# Patient Record
Sex: FEMALE | Race: WHITE | URBAN - METROPOLITAN AREA
[De-identification: names, ages, dates, MRNs, and addresses within clinical notes are randomized per-mention and may not be internally consistent; named-entity substitution may affect disease eponyms.]

---

## 2019-04-10 ENCOUNTER — HOSPITAL ENCOUNTER (OUTPATIENT)
Dept: PHYSICAL THERAPY | Age: 79
Discharge: HOME | End: 2019-04-10
Payer: MEDICARE

## 2019-04-10 PROCEDURE — 97161 PT EVAL LOW COMPLEX 20 MIN: CPT | Performed by: PHYSICAL THERAPIST

## 2019-04-10 NOTE — PROGRESS NOTES
.[facility] [address] [facility] THERAPY 
 [facility] [address],[facility], [address] - Phone: [phone]  Fax: [phone] PLAN OF CARE / STATEMENT OF MEDICAL NECESSITY FOR PHYSICAL THERAPY SERVICES Patient Name: Cristhian Parrish : 1940 Medical  
Diagnosis: lbp Treatment Diagnosis: Low back pain [M54.5] Onset Date: chronic Referral Source: [facility]): 4/10/2019 Prior Hospitalization: See medical history Provider #: [id number] Prior Level of Function: All adls limited by pain Comorbidities: FMS, depression arthritis, high blood pressure, CA Medications: Verified on Patient Summary List  
The Plan of Care and following information is based on the information from the initial evaluation.  
=========================================================================================== Assessment / key information:  [de-identified] F arrives to clinic with multiple body joint c/lo including low back, hips, knee, ankle/feet neck and hands. Per pt these sx are chronic and have progressed over the last 5 years and now requires A with all community activities. Dx with lumbar spinal stenosis. She has recently seen a neurologist from [facility] who is performing additional cervical imaging and is pending return. She had an RIAN in l5/s1 on 19 with improvement in sx. Objective findings: ls flexion 75% with finger tips to mid shin and rfis reducing sx, RR, ext and RSB 90% loss with increasing pain, myotome reduced l3/5 R>L, dermatome unremarkable, MSR <15\" B, SLS <5\", able to negotiate 8 steps with lateral reciprocal gait pattern, she is able to ambulate >/=600' without rest but increased lateral trunk lean and reduced stride length. At this time pt has deferred any evaluation for motorized scooter due to ability to ambulate into community. Will attempt aquatic therapy to address problem list below =========================================================================================== Eval Complexity: History HIGH Complexity :3+ comorbidities / personal factors will impact the outcome/ POC ;  Examination  HIGH Complexity : 4+ Standardized tests and measures addressing body structure, function, activity limitation and / or participation in recreation ; Presentation LOW Complexity : Stable, uncomplicated ;  Decision Making MEDIUM Complexity : FOTO score of 26-74; Overall Complexity LOW Problem List: pain affecting function, decrease ROM, decrease strength, decrease ADL/ functional abilitiies, decrease activity tolerance, decrease flexibility/ joint mobility and decrease transfer abilities Treatment Plan may include any combination of the following: Therapeutic exercise, Therapeutic activities, Neuromuscular re-education, Physical agent/modality, Gait/balance training, Manual therapy, Aquatic therapy, Patient education, Self Care training, Functional mobility training, Home safety training and Stair training Patient / Family readiness to learn indicated by: asking questions, trying to perform skills and interest 
Persons(s) to be included in education: patient (P) Barriers to Learning/Limitations: None Measures taken, if barriers to learning:   
Patient Goal (s): Decrease pain, increase ambulation Patient self reported health status: fair Rehabilitation Potential: fair ? Short Term Goals: To be accomplished in  2  weeks: 1. Pt will ambulate >/=5 minutes on treadmill in order to increase ambulation 2. Pt will be able to perform 10 mini-squats in order to improve sit<>stand 3. Pt will perform 10 standing hip abduction in order to improve pelvic stability during ambulation ? Long Term Goals: To be accomplished in  4  weeks: 1. Pt will increase FOTO by 10 points in order to show functional improvement 2. Pt will ambulate >/=10' on treadmill in order to improve community ambulation 3. Pt will note daily max pain </=7/10 in order to increase participation Frequency / Duration:   Patient to be seen  2  times per week for 4  weeks: 
Patient / Caregiver education and instruction: self care and activity modification Therapist Signature: Shani Chanel DPT [facility] Date: 4/10/2019 Certification Period: 4/10/19-7/5/19 Time: 3:31 PM  
=========================================================================================== I certify that the above Physical Therapy Services are being furnished while the patient is under my care. I agree with the treatment plan and certify that this therapy is necessary. Physician Signature:       Date:      Time:  Please sign and return to [facility]. or you may fax the signed copy to [phone]. Thank you.

## 2019-04-10 NOTE — PROGRESS NOTES
[facility] PHYSICAL THERAPY - DAILY TREATMENT NOTE Patient Name: Unique Xie        Date: 4/10/2019 : 1940   yes Patient  Verified Visit #:     Insurance: Payor: Sha Many / Plan: [place] & B / Product Type: Medicare / In time: 338 Out time: 415 Total Treatment Time: 34 Medicare/BCBS Time Tracking (below) Total Timed Codes (min):  0 1:1 Treatment Time:  0  
TREATMENT AREA =  Low back pain [M54.5] SUBJECTIVE Pain Level (on 0 to 10 scale):  6  / 10 Medication Changes/New allergies or changes in medical history, any new surgeries or procedures?    no  If yes, update Summary List  
Subjective Functional Status/Changes:  []  No changes reported See ie OBJECTIVE Billed With/As: 
 [] TE 
 [] TA 
 [] Neuro 
 [] Self Care Patient Education: [x] Review HEP [] Progressed/Changed HEP based on:  
[] positioning   [] body mechanics   [] transfers   [] heat/ice application   
[] other:   
Other Objective/Functional Measures: 
 
See ie Post Treatment Pain Level (on 0 to 10) scale:    10 ASSESSMENT Assessment/Changes in Function:  
 
See ie 
  
[]  See Progress Note/Recertification Patient will continue to benefit from skilled PT services to modify and progress therapeutic interventions, address functional mobility deficits, address ROM deficits, address strength deficits, analyze and address soft tissue restrictions, analyze and cue movement patterns, analyze and modify body mechanics/ergonomics, assess and modify postural abnormalities, address imbalance/dizziness and instruct in home and community integration to attain remaining goals. Progress toward goals / Updated goals: 
See ie PLAN 
[]  Upgrade activities as tolerated yes Continue plan of care  
[]  Discharge due to :   
[]  Other:   
 
Therapist: Fredi Espinoza, PT Date: 4/10/2019 Time: 3:32 PM  
 
No future appointments.

## 2019-04-15 ENCOUNTER — APPOINTMENT (OUTPATIENT)
Dept: PHYSICAL THERAPY | Age: 79
End: 2019-04-15
Payer: MEDICARE

## 2019-04-18 ENCOUNTER — HOSPITAL ENCOUNTER (OUTPATIENT)
Dept: PHYSICAL THERAPY | Age: 79
Discharge: HOME | End: 2019-04-18
Payer: MEDICARE

## 2019-04-18 PROCEDURE — 97113 AQUATIC THERAPY/EXERCISES: CPT

## 2019-04-22 ENCOUNTER — HOSPITAL ENCOUNTER (OUTPATIENT)
Dept: PHYSICAL THERAPY | Age: 79
Discharge: HOME | End: 2019-04-22
Payer: MEDICARE

## 2019-04-22 PROCEDURE — 97113 AQUATIC THERAPY/EXERCISES: CPT

## 2019-04-22 NOTE — PROGRESS NOTES
PHYSICAL THERAPY - DAILY TREATMENT NOTE - AQUATIC THERAPY Patient Name: So Valencia        Date: 2019 : 1940   YES Patient  Verified Visit #:   3   of   9  Insurance: Payor: Carlos Burch / Plan: [place] MEDICARE PART A & B / Product Type: Medicare / In time: 105 Out time: 130 Total Treatment Time: 25 Medicare Time Tracking (below) Total Timed Codes (min):  25 1:1 Treatment Time:  25 TREATMENT AREA =  Low back pain [M54.5] SUBJECTIVE Pain Level (on 0 to 10 scale):  5  / 10 Medication Changes/New allergies or changes in medical history, any new surgeries or procedures? NO If yes, update Summary List  
Subjective Functional Status/Changes:  []  No changes reported I was in a lot of pain after last session but it settled. Then yesterday I did a lot of work around the house and i'm still very sore from all that. OBJECTIVE 25 min Aquatic Therapy:  [x]  See flow sheet Rationale:      increase ROM, increase strength, improve coordination and improve balance to improve the patients ability to perform functional mobility activities with decrease c/o symptoms. min Patient Education:  YES  Reviewed HEP []  Progressed/Changed HEP based on: Other Objective/Functional Measures: Add forward and lateral step up 10 x each (B), side stepping. Post Treatment Pain Level (on 0 to 10) scale:   5  / 10 ASSESSMENT Assessment/Changes in Function: No change in function reported continues to have increase pain with household activities. []  See Progress Note/Recertification Patient will continue to benefit from skilled PT services to modify and progress therapeutic interventions, address functional mobility deficits, address ROM deficits, address strength deficits and analyze and cue movement patterns to attain remaining goals. Progress toward goals / Updated goals: · Short Term Goals: To be accomplished in  2  weeks: 1. Pt will ambulate >/=5 minutes on treadmill in order to increase ambulation 2. Pt will be able to perform 10 mini-squats in order to improve sit<>stand 3. Pt will perform 10 standing hip abduction in order to improve pelvic stability during ambulation  
· Long Term Goals: To be accomplished in  4  weeks: 1. Pt will increase FOTO by 10 points in order to show functional improvement 2. Pt will ambulate >/=10' on treadmill in order to improve community ambulation 3. Pt will note daily max pain </=7/10 in order to increase participation  
No change toward goals noted today. PLAN 
[]  Upgrade activities as tolerated YES Continue plan of care  
[]  Discharge due to :   
[]  Other:   
 
Therapist: Koby Rincon PTA Date: 4/22/2019 Time: 6:32 AM  
 
 
Future Appointments Date Time Provider Leonel Salazar 4/22/2019  1:00 PM Marge Story PTA [facility]  
4/24/2019  1:00 PM [facility]  
4/30/2019  2:30 PM [facility]  
5/2/2019  2:00 PM [facility]  
5/7/2019  2:00 PM [facility]  
5/8/2019  1:30 PM Sol Valdes PT [facility]  
5/9/2019  2:00 PM [facility]

## 2019-04-24 ENCOUNTER — HOSPITAL ENCOUNTER (OUTPATIENT)
Dept: PHYSICAL THERAPY | Age: 79
Discharge: HOME | End: 2019-04-24
Payer: MEDICARE

## 2019-04-24 PROCEDURE — 97113 AQUATIC THERAPY/EXERCISES: CPT

## 2019-04-24 NOTE — PROGRESS NOTES
PHYSICAL THERAPY - DAILY TREATMENT NOTE - AQUATIC THERAPY Patient Name: Izabel Hawkins        Date: 2019 : 1940   YES Patient  Verified Visit #:     Insurance: Payor: Shay Hayes / Plan: [employer] / Product Type: Medicare / In time: 100 Out time: 130 Total Treatment Time: 30 Medicare Time Tracking (below) Total Timed Codes (min):  30 1:1 Treatment Time:  30 TREATMENT AREA =  Low back pain [M54.5] SUBJECTIVE Pain Level (on 0 to 10 scale):  5-6  / 10 Medication Changes/New allergies or changes in medical history, any new surgeries or procedures? NO If yes, update Summary List  
Subjective Functional Status/Changes:  []  No changes reported Very bad day yesterday and I feel weak all over today. I saw the neurosurgeon Dr. Opal Rodríguez yesterday and she said that my upper neck is fine. OBJECTIVE 30 min Aquatic Therapy:  [x]  See flow sheet Rationale:      increase ROM, increase strength, improve coordination and improve balance to improve the patients ability to perform functional mobility activities with decrease c/o symptoms. min Patient Education:  YES  Reviewed HEP []  Progressed/Changed HEP based on: Other Objective/Functional Measures: 
 
Continue wit therx per flow sheet without change in routine. Reports no change in symptoms and they vary day to day. Post Treatment Pain Level (on 0 to 10) scale:  4 / 10 ASSESSMENT Assessment/Changes in Function:  
 
Continues to report difficulty with performance of general ADLs and functional mobility activities from day to day. []  See Progress Note/Recertification Patient will continue to benefit from skilled PT services to modify and progress therapeutic interventions, address functional mobility deficits, address ROM deficits, address strength deficits, analyze and address soft tissue restrictions and analyze and cue movement patterns to attain remaining goals. Progress toward goals / Updated goals: · Short Term Goals: To be accomplished in  2  weeks: 1. Pt will ambulate >/=5 minutes on treadmill in order to increase ambulation 2. Pt will be able to perform 10 mini-squats in order to improve sit<>stand: progressing 4/24/19 
· 3. Pt will perform 10 standing hip abduction in order to improve pelvic stability during ambulation: progressing 4/24/19 · Long Term Goals: To be accomplished in  4  weeks: 1. Pt will increase FOTO by 10 points in order to show functional improvement 2. Pt will ambulate >/=10' on treadmill in order to improve community ambulation 3. Pt will note daily max pain </=7/10 in order to increase participation  PLAN 
[]  Upgrade activities as tolerated YES Continue plan of care  
[]  Discharge due to :   
[]  Other:   
 
Therapist: Db Hernandez PTA Date: 4/24/2019 Time: 6:32 AM  
 
 
Future Appointments Date Time Provider Leonel Salazar 4/24/2019  1:00 PM Jaun Meckel [facility] [address]  
4/30/2019  2:30 PM Jaun Meckel [facility] [address]  
5/2/2019  2:00 PM Jaun Meckel [facility] [address]  
5/7/2019  2:00 PM Jaun Meckel [facility] [address]  
5/8/2019  1:30 PM Higinio Pace, PT [place] [address]  
5/9/2019  2:00 PM Jaun Meckel [facility] [address]

## 2019-04-30 ENCOUNTER — HOSPITAL ENCOUNTER (OUTPATIENT)
Dept: PHYSICAL THERAPY | Age: 79
Discharge: HOME | End: 2019-04-30
Payer: MEDICARE

## 2019-04-30 PROCEDURE — 97113 AQUATIC THERAPY/EXERCISES: CPT

## 2019-04-30 NOTE — PROGRESS NOTES
PHYSICAL THERAPY - DAILY TREATMENT NOTE - AQUATIC THERAPY Patient Name: Negra Lezama        Date: 2019 : 1940   YES Patient  Verified Visit #:   5   of   9  Insurance: Payor: Kim Castro / Plan: [employer] / Product Type: Medicare / In time: 225 Out time: 255 Total Treatment Time: 30 Medicare Time Tracking (below) Total Timed Codes (min):  30 1:1 Treatment Time:  30 TREATMENT AREA =  Low back pain [M54.5] SUBJECTIVE Pain Level (on 0 to 10 scale):   10 Medication Changes/New allergies or changes in medical history, any new surgeries or procedures? NO If yes, update Summary List  
Subjective Functional Status/Changes:  []  No changes reported Yesterday I was in a lot of pain like a 7/10. No reason why, it was just bad. OBJECTIVE 30 min Aquatic Therapy:  [x]  See flow sheet Rationale:      increase ROM, increase strength, improve coordination and improve balance to improve the patients ability to perform functional mobility activities with decrease c/o symptoms.  
 
 min Patient Education:  Wanda Gill []  Progressed/Changed HEP based on: Other Objective/Functional Measures: 
 
Increase reps to 15 x with overall good tolerance and no signs of distress or increase of pain. Post Treatment Pain Level (on 0 to 10) scale:   3  / 10 ASSESSMENT Assessment/Changes in Function: No change in function. Ability to perform activities is dependent on pain levels from day to day. []  See Progress Note/Recertification Patient will continue to benefit from skilled PT services to modify and progress therapeutic interventions, address functional mobility deficits, address ROM deficits, address strength deficits and analyze and cue movement patterns to attain remaining goals. Progress toward goals / Updated goals: · Short Term Goals: To be accomplished in  2  weeks: 1.  Pt will ambulate >/=5 minutes on treadmill in order to increase ambulation 2. Pt will be able to perform 10 mini-squats in order to improve sit<>stand: progressing 4/24/19 3. Pt will perform 10 standing hip abduction in order to improve pelvic stability during ambulation: progressing 4/24/19 · Long Term Goals: To be accomplished in  4  weeks: 1. Pt will increase FOTO by 10 points in order to show functional improvement 2. Pt will ambulate >/=10' on treadmill in order to improve community ambulation 3. Pt will note daily max pain </=7/10 in order to increase participation  PLAN 
[]  Upgrade activities as tolerated YES Continue plan of care  
[]  Discharge due to :   
[]  Other:   
 
Therapist: Manuel Orozco PTA Date: 4/30/2019 Time: 6:07 AM  
 
 
Future Appointments Date Time Provider Leonel Salazar 4/30/2019  2:30 PM Mercedes Simpson PTA [facility]  
5/2/2019  2:00 PM General Plume [facility]  
5/7/2019  2:00 PM General Plume [facility]  
5/8/2019  1:30 PM Jv Álvarez PT [facility]  
5/9/2019  2:00 PM General ume [facility]

## 2019-05-02 ENCOUNTER — HOSPITAL ENCOUNTER (OUTPATIENT)
Dept: PHYSICAL THERAPY | Age: 79
Discharge: HOME | End: 2019-05-02
Payer: MEDICARE

## 2019-05-02 PROCEDURE — 97113 AQUATIC THERAPY/EXERCISES: CPT

## 2019-05-02 NOTE — PROGRESS NOTES
PHYSICAL THERAPY - DAILY TREATMENT NOTE - AQUATIC THERAPY Patient Name: Cristhian Parrish        Date: 2019 : 1940   YES Patient  Verified Visit #:   6     Insurance: Payor: Bassam Ca / Plan: [address] / Product Type: Medicare / In time: 155 Out time: 225 Total Treatment Time: 30 Medicare Time Tracking (below) Total Timed Codes (min):  30 1:1 Treatment Time:  30 TREATMENT AREA =  Low back pain [M54.5] SUBJECTIVE Pain Level (on 0 to 10 scale):  7  / 10 Medication Changes/New allergies or changes in medical history, any new surgeries or procedures? NO If yes, update Summary List  
Subjective Functional Status/Changes:  []  No changes reported I have been in a lot of pain since Tuesday, especially on the right side of my body. And for the last few days I feel like my balance has been getting worse. OBJECTIVE 30 min Aquatic Therapy:  [x]  See flow sheet Rationale:      increase ROM, increase strength, improve coordination and improve balance to improve the patients ability to perform functional mobility activities with decrease c/o symptoms.  
   
 min Patient Education:  Lanny Albarran []  Progressed/Changed HEP based on: Other Objective/Functional Measures: 
 
Max pain 8-9/10, least pain 4-5/10, average pain 6-7/10,  Continues to report pain varies daily with or without levels of activities. Post Treatment Pain Level (on 0 to 10) scale:   6  / 10 ASSESSMENT Assessment/Changes in Function:  
 
Continue with threx at present level, no changes. []  See Progress Note/Recertification Patient will continue to benefit from skilled PT services to modify and progress therapeutic interventions, address functional mobility deficits, address ROM deficits, address strength deficits and analyze and cue movement patterns to attain remaining goals. Progress toward goals / Updated goals: · Short Term Goals: To be accomplished in  2  weeks: 1. Pt will ambulate >/=5 minutes on treadmill in order to increase ambulation 2. Pt will be able to perform 10 mini-squats in order to improve sit<>stand: progressing 4/24/19 3. Pt will perform 10 standing hip abduction in order to improve pelvic stability during ambulation: progressing 4/24/19 · Long Term Goals: To be accomplished in  4  weeks: 1. Pt will increase FOTO by 10 points in order to show functional improvement 2. Pt will ambulate >/=10' on treadmill in order to improve community ambulation 3. Pt will note daily max pain </=7/10 in order to increase participation: slow progression 5/2/19 PLAN 
[]  Upgrade activities as tolerated YES Continue plan of care  
[]  Discharge due to :   
[]  Other:   
 
Therapist: Koby Rincon PTA Date: 5/2/2019 Time: 6:17 AM  
 
 
Future Appointments Date Time Provider Leonel Salazar [facility]2019  [facility]  
[facility]  [facility]  
5/8/2019  1:30 PM Sol Valdes PT [facility]  
5/9/2019  2:00 PM Luna [facility]

## 2019-05-07 ENCOUNTER — APPOINTMENT (OUTPATIENT)
Dept: PHYSICAL THERAPY | Age: 79
End: 2019-05-07
Payer: MEDICARE

## 2019-05-08 ENCOUNTER — HOSPITAL ENCOUNTER (OUTPATIENT)
Dept: PHYSICAL THERAPY | Age: 79
Discharge: HOME | End: 2019-05-08
Payer: MEDICARE

## 2019-05-08 PROCEDURE — 97112 NEUROMUSCULAR REEDUCATION: CPT | Performed by: PHYSICAL THERAPIST

## 2019-05-08 PROCEDURE — 97164 PT RE-EVAL EST PLAN CARE: CPT | Performed by: PHYSICAL THERAPIST

## 2019-05-08 NOTE — PROGRESS NOTES
.[facility] [address] [facility] THERAPY 
 [facility] [address],[facility], [address] [facility] - Phone: [phone]  Fax: [phone] CONTINUED PLAN OF CARE/RECERTIFICATION FOR PHYSICAL THERAPY Patient Name: Kyung Gaines : 1940 Treatment/Medical Diagnosis: Low back pain [M54.5] Onset Date: chronic Referral Source: [facility] Start of Care [facility]): 4/10/19 Prior Hospitalization: See Medical History Provider #: [id number] Prior Level of Function: All adls limited by pain Comorbidities: FMS, depression, arthritis, high blood pressure, CA Medications: Verified on Patient Summary List  
Visits from [facility]: 8 Missed Visits: 0 Goal/Measure of Progress Goal Met? 1. Pt will increase FOTO by 10 points in order to show functional improvement Status at last Eval: 33/100 Current Status: 35/100 progressing 2. Pt will ambulate >/=10' on treadmill in order to improve community ambulation Status at last Eval: na Current Status: 6' no 3. Pt will decrease daily max pain </=7/10 in order to increase participation in adls Status at last Eval: 10/10 Current Status: 9/10 Slow progress Key Functional Changes/Progress: pt made very little progress with aquatic therapy in regards to her pain levels and wb tolerance. During this time period she was seen by a neurosurgeon to discuss l4/5 laminectomy to address continued B LE radiculopathy. Pt currently lives alone and has opted to await surgery until summer when her children will be able to care for her. Her current limitations include poor balance and strength limiting her functional mobility/transfers and safety.  Objective findings: LE MMT, hip flex 3/5 B, hip abd 3+/5, R 3/5 L, ext 3-/5 B, knee ext 4-/5 R, 3/5 L, DF/PF 4+/5, B, UE reduced shoulder flex 4-/5 R, 3+/5 L, abd 3+/5 B, sit<>stand definitive use of hands, forward reach limited 12 inches before lob, stairs require B UE and non-reciprocal gait pattern (pt reports no longer attempting in her 2 story house due to weakness), rhomberg 8\" R, 12\" L, SLS unable to perform without immediate lob. Discussed at length with pt how the above findings are impacting her safety and ability to [facility] independently. Pt  Would like to attempt therapy to address balance/weakness vs pain reduction in gym setting in order to improve pt safety and prepare for surgery. Believe if pt does not receive skilled therapy services she will have increased risk for falls. Problem List: pain affecting function, decrease ROM, decrease strength, impaired gait/ balance, decrease ADL/ functional abilitiies, decrease activity tolerance, decrease flexibility/ joint mobility and decrease transfer abilities Treatment Plan may include any combination of the following: Therapeutic exercise, Therapeutic activities, Neuromuscular re-education, Physical agent/modality, Gait/balance training, Manual therapy, Patient education, Self Care training, Functional mobility training, Home safety training and Stair training Patient Goal(s) has been updated and includes:    
? Goals for this certification period include and are to be achieved in   4  weeks: 1. Achieve goal #1 2. Pt will be able to sit<>stand from 48\" surface without UE use to improve transfers 3. Pt will increase rhomberg balance >/= 30\" to improve static balance for adls such as dressing/self care Frequency / Duration:   Patient to be seen   2   times per week for   4    weeks: 
Assessments/Recommendations: continue therapy in clinical setting with emphasis on balance/strength to improve safety If you have any questions/comments please contact us directly at [phone]. Thank you for allowing us to assist in the care of your patient. Therapist Signature: Kemar Mckeon DPT, [address]  Date: 5/8/2019 Certification Period: 
Reporting Period: 4/10/19-7/5/19 4/10/19-5/8/19 Time: 3:46 PM  
NOTE TO PHYSICIAN:  PLEASE COMPLETE THE ORDERS BELOW AND FAX TO [facility] Physical Therapy: [phone] If you are unable to process this request in 24 hours please contact our office: [phone] 
 
___ I have read the above report and request that my patient continue as recommended.  
___ I have read the above report and request that my patient continue therapy with the following changes/special instructions: ________________________________________________  
___ I have read the above report and request that my patient be discharged from therapy.   
 
Physician Signature:       Date:      Time:

## 2019-05-08 NOTE — PROGRESS NOTES
[facility] PHYSICAL THERAPY - DAILY TREATMENT NOTE Patient Name: Soumya Nava        Date: 2019 : 1940   yes Patient  Verified Visit #:   7   of     Insurance: Payor: Nicol Torres / Plan: [address] / Product Type: Medicare / In time: 130 Out time: 200 Total Treatment Time: 30 Medicare/BCBS Time Tracking (below) Total Timed Codes (min):  0 1:1 Treatment Time:  0  
TREATMENT AREA =  Low back pain [M54.5] SUBJECTIVE Pain Level (on 0 to 10 scale):  5  / 10 Medication Changes/New allergies or changes in medical history, any new surgeries or procedures?    no  If yes, update Summary List  
Subjective Functional Status/Changes:  []  No changes reported See pn OBJECTIVE Billed With/As: 
 [] TE 
 [] TA 
 [] Neuro 
 [] Self Care Patient Education: [x] Review HEP [] Progressed/Changed HEP based on:  
[] positioning   [] body mechanics   [] transfers   [] heat/ice application   
[] other:   
Other Objective/Functional Measures: 
 
See pn  
  
Post Treatment Pain Level (on 0 to 10) scale:    10 ASSESSMENT Assessment/Changes in Function:  
 
See pn  
  
[]  See Progress Note/Recertification Patient will continue to benefit from skilled PT services to modify and progress therapeutic interventions, address functional mobility deficits, address ROM deficits, address strength deficits, analyze and address soft tissue restrictions, analyze and cue movement patterns, analyze and modify body mechanics/ergonomics, assess and modify postural abnormalities, address imbalance/dizziness and instruct in home and community integration to attain remaining goals. Progress toward goals / Updated goals: 
See pn  
 
PLAN 
[]  Upgrade activities as tolerated yes Continue plan of care  
[]  Discharge due to :   
[]  Other:   
 
Therapist: Danny Campbell PT Date: 2019 Time: 3:45 PM  
 
Future Appointments Date Time Provider Leonel Salazar 5/16/2019  2:00 PM Derek Mcelroy, PT [facility]  
5/21/2019  3:00 PM Derek Mcelroy, PT [facility]  
5/23/2019  2:00 PM Harley Mclaughlin [facility]  
6/4/2019  2:30 PM Derek Mcelroy, PT [facility]  
6/6/2019  2:30 PM Harley Mclaughlin [facility]  
6/11/2019  2:30 PM Derek Mcelroy, PT [facility]  
6/13/2019  2:30 PM Harley Mclaughlin [facility]  
6/18/2019  2:30 PM Harley Mclaughlin [facility]  
6/20/2019  2:30 PM Harley Mclaughlin [facility]  
6/24/2019  2:30 PM Derek Mcelroy, PT [facility]  
6/26/2019  2:30 PM Derek Mcelroy, PT [facility]

## 2019-05-09 ENCOUNTER — APPOINTMENT (OUTPATIENT)
Dept: PHYSICAL THERAPY | Age: 79
End: 2019-05-09
Payer: MEDICARE

## 2019-05-14 ENCOUNTER — APPOINTMENT (OUTPATIENT)
Dept: PHYSICAL THERAPY | Age: 79
End: 2019-05-14
Payer: MEDICARE

## 2019-05-16 ENCOUNTER — HOSPITAL ENCOUNTER (OUTPATIENT)
Dept: PHYSICAL THERAPY | Age: 79
Discharge: HOME | End: 2019-05-16
Payer: MEDICARE

## 2019-05-16 PROCEDURE — 97112 NEUROMUSCULAR REEDUCATION: CPT | Performed by: PHYSICAL THERAPIST

## 2019-05-16 PROCEDURE — 97110 THERAPEUTIC EXERCISES: CPT | Performed by: PHYSICAL THERAPIST

## 2019-05-16 NOTE — PROGRESS NOTES
[facility] PHYSICAL THERAPY - DAILY TREATMENT NOTE Patient Name: Mely Stoddard        Date: 2019 : 1940   yes Patient  Verified Visit #:     Insurance: Payor: Alex Pang / Plan: [place] MEDICARE PART A & B / Product Type: Medicare / In time: 200 Out time: 46 Total Treatment Time: 45 Medicare/BCBS Time Tracking (below) Total Timed Codes (min):  35 1:1 Treatment Time:  35 TREATMENT AREA =  Low back pain [M54.5] SUBJECTIVE Pain Level (on 0 to 10 scale):  6  / 10 Medication Changes/New allergies or changes in medical history, any new surgeries or procedures?    no  If yes, update Summary List  
Subjective Functional Status/Changes:  []  No changes reported Today is actually a good day as far as my pain goes. The last 3 days before that were pretty bad. This is what happens never know day to day OBJECTIVE Modalities Rationale:     decrease pain and increase tissue extensibility to improve patient's ability to stand prolonged periods 
 min [] Estim, type/location:   
                                 []  att     []  unatt     []  w/US     []  w/ice    []  w/heat 
 min []  Mechanical Traction: type/lbs   
               []  pro   []  sup   []  int   []  cont    []  before manual    []  after manual  
 min []  Ultrasound, settings/location:    
 min []  Iontophoresis w/ dexamethasone, location:   
                                           []  take home patch       []  in clinic  
10 min []  Ice     [x]  Heat    location/position: Sitting   
 min []  Vasopneumatic Device, press/temp:   
 min []  Other:   
[x] Skin assessment post-treatment (if applicable):   
[x]  intact    []  redness- no adverse reaction    
[]redness  adverse reaction:     
20 min Therapeutic Exercise:  [x]  See flow sheet Rationale:      increase ROM, increase strength, improve coordination, improve balance and increase proprioception to improve the patients ability to complete adls 15 min Neuromuscular Re-ed: [x]  See flow sheet Rationale:    improve coordination, improve balance and increase proprioception to improve the patients ability to ambulate safely Billed With/As: 
 [] TE 
 [] TA 
 [] Neuro 
 [] Self Care Patient Education: [x] Review HEP [] Progressed/Changed HEP based on:  
[] positioning   [] body mechanics   [] transfers   [] heat/ice application   
[] other:   
Other Objective/Functional Measures: 
 
Initiated land based te as per flow sheet to improve pt strength/balance to improve safety during adls Fatigued very quickly with standing te requiring sitting breaks between exercises to perform Post Treatment Pain Level (on 0 to 10) scale:   4  / 10 ASSESSMENT Assessment/Changes in Function: No increase in pain following initial session but pt was advised on doms and use of ice prn 
  
[]  See Progress Note/Recertification Patient will continue to benefit from skilled PT services to modify and progress therapeutic interventions, address functional mobility deficits, address ROM deficits, address strength deficits, analyze and address soft tissue restrictions, analyze and cue movement patterns, analyze and modify body mechanics/ergonomics, assess and modify postural abnormalities, address imbalance/dizziness and instruct in home and community integration to attain remaining goals. Progress toward goals / Updated goals: 
Pt given advanced hep and will assess compliance PLAN 
[]  Upgrade activities as tolerated yes Continue plan of care  
[]  Discharge due to :   
[]  Other:   
 
Therapist: Abbe Ferrell PT Date: 5/16/2019 Time: 2:16 PM  
 
Future Appointments Date Time Provider Leonel Salazar 5/21/2019  3:00 PM Galo Quiñonez PT [facility]  
5/23/2019  2:00 PM Leonardo Cabezas [facility]  
6/4/2019  2:30 PM Galo Quiñonez PT [facility]  
6/6/2019  2:30 PM Galo Quiñonez PT [facility] 6/11/2019  2:30 PM Mikayla Purcell, PT [facility]  
6/13/2019  2:30 PM Gabi Schumacher [facility]  
[facility]  [facility]  
6/24/2019  2:30 PM Mikayla Purcell, PT [facility]  
6/26/2019  2:30 PM Israel Mireles, PT [facility]

## 2019-05-21 ENCOUNTER — HOSPITAL ENCOUNTER (OUTPATIENT)
Dept: PHYSICAL THERAPY | Age: 79
Discharge: HOME | End: 2019-05-21
Payer: MEDICARE

## 2019-05-21 PROCEDURE — 97112 NEUROMUSCULAR REEDUCATION: CPT | Performed by: PHYSICAL THERAPIST

## 2019-05-21 PROCEDURE — 97110 THERAPEUTIC EXERCISES: CPT | Performed by: PHYSICAL THERAPIST

## 2019-05-23 ENCOUNTER — HOSPITAL ENCOUNTER (OUTPATIENT)
Dept: PHYSICAL THERAPY | Age: 79
Discharge: HOME | End: 2019-05-23
Payer: MEDICARE

## 2019-05-23 PROCEDURE — 97112 NEUROMUSCULAR REEDUCATION: CPT | Performed by: PHYSICAL THERAPIST

## 2019-05-23 PROCEDURE — 97110 THERAPEUTIC EXERCISES: CPT | Performed by: PHYSICAL THERAPIST

## 2019-05-23 PROCEDURE — 97140 MANUAL THERAPY 1/> REGIONS: CPT | Performed by: PHYSICAL THERAPIST

## 2019-05-23 NOTE — PROGRESS NOTES
Cinthia Daljit   PHYSICAL THERAPY - DAILY TREATMENT NOTE    Patient Name: Sol Stapleton        Date: 2019  : 1940   yes Patient  Verified  Visit #:   10   of   16  Insurance: Payor: Gold Cheney / Plan: [place] MEDICARE PART A & B / Product Type: Medicare /      In time: 157 Out time: 310   Total Treatment Time: 67     Medicare/BCBS Time Tracking (below)   Total Timed Codes (min):  57 1:1 Treatment Time:  57     TREATMENT AREA =  Low back pain [M54.5]    SUBJECTIVE  Pain Level (on 0 to 10 scale):  8  / 10   Medication Changes/New allergies or changes in medical history, any new surgeries or procedures?    no  If yes, update Summary List   Subjective Functional Status/Changes:  []  No changes reported     I felt good for about a hour after last time but then I started having a lot of pain on this right side           OBJECTIVE  Modalities Rationale:     decrease pain and increase tissue extensibility to improve patient's ability to ambulate prolonged distances    min [] Estim, type/location:                                      []  att     []  unatt     []  w/US     []  w/ice    []  w/heat    min []  Mechanical Traction: type/lbs                   []  pro   []  sup   []  int   []  cont    []  before manual    []  after manual    min []  Ultrasound, settings/location:      min []  Iontophoresis w/ dexamethasone, location:                                               []  take home patch       []  in clinic   10 min []  Ice     [x]  Heat    location/position: Sitting     min []  Vasopneumatic Device, press/temp:     min []  Other:    [x] Skin assessment post-treatment (if applicable):    [x]  intact    []  redness- no adverse reaction     []redness  adverse reaction:        27 min Therapeutic Exercise:  [x]  See flow sheet   Rationale:      increase ROM and increase strength to improve the patients ability to complete adls      15 min Manual Therapy: stm R gm/piri/tfl   Rationale:      decrease pain, increase ROM, increase tissue extensibility and decrease trigger points to improve patient's ability to complete adls       15 min Neuromuscular Re-ed: [x]  See flow sheet   Rationale:    improve coordination, improve balance and increase proprioception to improve the patients ability to ambulate safely       Billed With/As:   [] TE   [] TA   [] Neuro   [] Self Care Patient Education: [x] Review HEP    [] Progressed/Changed HEP based on:   [] positioning   [] body mechanics   [] transfers   [] heat/ice application    [] other:      Other Objective/Functional Measures:    Pr arrived to session with increased pain along r buttock area - reproduced pt chief pain c/o with palpation to posterior fibers of gm and piri, attempted both standing and sitting stretch of this area but due to lack of rom unable to perform   Post Treatment Pain Level (on 0 to 10) scale:   5  / 10     ASSESSMENT  Assessment/Changes in Function:     Pt reported reduction in pain following session      []  See Progress Note/Recertification   Patient will continue to benefit from skilled PT services to modify and progress therapeutic interventions, address functional mobility deficits, address ROM deficits, address strength deficits, analyze and address soft tissue restrictions, analyze and cue movement patterns, analyze and modify body mechanics/ergonomics, assess and modify postural abnormalities and instruct in home and community integration to attain remaining goals.    Progress toward goals / Updated goals:    Very slow progress with ambulation/wb goal     PLAN  []  Upgrade activities as tolerated yes Continue plan of care   []  Discharge due to :    []  Other:      Therapist: Malia Harper PT    Date: 5/23/2019 Time: 2:36 PM     Future Appointments   Date Time Provider Leonel Salazar   6/4/2019  2:30 PM [facility]   6/6/2019  2:30 PM Peter [facility]   6/11/2019  2:30 PM Sol Valdes PT [facility]   6/13/2019  2:30 PM Pat List [facility]   6/18/2019  2:30 PM Pat List [facility]   6/20/2019  2:30 PM Pat List [facility]   6/24/2019  2:30 PM Pat List [facility]   6/26/2019  2:30 PM Prabhakar Mireles, PT [facility]

## 2019-06-04 ENCOUNTER — APPOINTMENT (OUTPATIENT)
Dept: PHYSICAL THERAPY | Age: 79
End: 2019-06-04
Payer: MEDICARE

## 2019-06-06 ENCOUNTER — HOSPITAL ENCOUNTER (OUTPATIENT)
Dept: PHYSICAL THERAPY | Age: 79
Discharge: HOME | End: 2019-06-06
Payer: MEDICARE

## 2019-06-06 PROCEDURE — 97112 NEUROMUSCULAR REEDUCATION: CPT | Performed by: PHYSICAL THERAPIST

## 2019-06-06 PROCEDURE — 97140 MANUAL THERAPY 1/> REGIONS: CPT | Performed by: PHYSICAL THERAPIST

## 2019-06-06 PROCEDURE — 97110 THERAPEUTIC EXERCISES: CPT | Performed by: PHYSICAL THERAPIST

## 2019-06-06 NOTE — PROGRESS NOTES
[facility]   PHYSICAL THERAPY - DAILY TREATMENT NOTE    Patient Name: Soumya Nava        Date: 2019  : 1940   yes Patient  Verified  Visit #:     Insurance: Payor: Nicol Torres / Plan: [place] MEDICARE PART A & B / Product Type: Medicare /      In time: 225 Out time: 330   Total Treatment Time: 60     Medicare/BCBS Time Tracking (below)   Total Timed Codes (min):  50 1:1 Treatment Time:  50     TREATMENT AREA =  Low back pain [M54.5]    SUBJECTIVE  Pain Level (on 0 to 10 scale):  5 / 10   Medication Changes/New allergies or changes in medical history, any new surgeries or procedures?    no  If yes, update Summary List   Subjective Functional Status/Changes:  []  No changes reported     See pn        OBJECTIVE  Modalities Rationale:     decrease pain and increase tissue extensibility to improve patient's ability to ambulate prolonged distances    min [] Estim, type/location:                                      []  att     []  unatt     []  w/US     []  w/ice    []  w/heat    min []  Mechanical Traction: type/lbs                   []  pro   []  sup   []  int   []  cont    []  before manual    []  after manual    min []  Ultrasound, settings/location:      min []  Iontophoresis w/ dexamethasone, location:                                               []  take home patch       []  in clinic   10 min []  Ice     [x]  Heat    location/position: Sitting     min []  Vasopneumatic Device, press/temp:     min []  Other:    [x] Skin assessment post-treatment (if applicable):    [x]  intact    []  redness- no adverse reaction     []redness  adverse reaction:        25 min Therapeutic Exercise:  [x]  See flow sheet   Rationale:      increase ROM and increase strength to improve the patients ability to complete adls      10 min Manual Therapy: stm R gm/piri/tfl   Rationale:      decrease pain, increase ROM, increase tissue extensibility and decrease trigger points to improve patient's ability to complete adls 15 min Neuromuscular Re-ed: [x]  See flow sheet   Rationale:    improve coordination, improve balance and increase proprioception to improve the patients ability to ambulate safely       Billed With/As:   [] TE   [] TA   [] Neuro   [] Self Care Patient Education: [x] Review HEP    [] Progressed/Changed HEP based on:   [] positioning   [] body mechanics   [] transfers   [] heat/ice application    [] other:      Other Objective/Functional Measures:    See pn   Post Treatment Pain Level (on 0 to 10) scale:   5  / 10     ASSESSMENT  Assessment/Changes in Function:     See pn      []  See Progress Note/Recertification   Patient will continue to benefit from skilled PT services to modify and progress therapeutic interventions, address functional mobility deficits, address ROM deficits, address strength deficits, analyze and address soft tissue restrictions, analyze and cue movement patterns, analyze and modify body mechanics/ergonomics, assess and modify postural abnormalities and instruct in home and community integration to attain remaining goals.    Progress toward goals / Updated goals:    See pn      PLAN  []  Upgrade activities as tolerated yes Continue plan of care   []  Discharge due to :    []  Other:      Therapist: Tess Ball PT    Date: 6/6/2019 Time: 2:36 PM     Future Appointments   Date Time Provider Leonel Salazar   6/11/2019  2:30 PM Temitope Ontiveros PT [facility]   6/13/2019  2:30 PM Jareth Bae [facility]   6/18/2019  2:30 PM Jareth Bae [facility]   6/20/2019  2:30 PM Jareth Bae [facility]   6/24/2019  2:30 PM Temitope Ontiveros PT [facility]   6/26/2019  2:30 PM Temitope Ontiveros PT [facility]

## 2019-06-10 NOTE — PROGRESS NOTES
CLARISA Pearson3 PHYSICAL THERAPY   [facility],[facility], [facility] - Phone: [phone]  Fax: [phone]  [address] [place] [address] PHYSICAL THERAPY          Patient Name: Marisa Carrion : 1940   Treatment/Medical Diagnosis: Low back pain [M54.5]   Onset Date: chronic    Referral Source: Hannah Griggs DO Start of Care [facility]): 4/10/19   Prior Hospitalization: See Medical History Provider #: [id number]   Prior Level of Function: All adls limited by pain   Comorbidities: FMS, depression, arthritis, high blood pressure, CA    Medications: Verified on Patient Summary List   Visits from [facility]: 12 Missed Visits: 2     Goal/Measure of Progress Goal Met? 1. Pt will increase FOTO by 10 points in order to show functional improvement   Status at last Eval: 33/100 Current Status: 35/100 progressing   2. Pt will be able to sit<>stand from 48\" surface without UE use to improve transfers   Status at last Eval: Definitive use of hands  Current Status: 54\" surfac progressing   3. Pt will increase rhomberg balance >/= 30\" to improve static balance for adls such as dressing/self care   Status at last Eval: 8\" Current Status: 20\"  progressing      Key Functional Changes/Progress: pt has attended only 4 land based visits in the last month. She still continues to have reports of B LE and low back pain after prolonged standing/walking. She still continue to utilize compensatory mechanics for transfers and [facility] for al distances due to lob.  Objective findings: LE MMT, hip flex 3+/5 B, hip abd 4-/5, R 3/5 L, ext 3/5 B, knee ext 4-/5 R, 3/5 L, DF/PF 4+/5, B, UE reduced shoulder flex 4-/5 B, abd 3+/5 B, sit<>stand definitive use of hands, forward reach limited 15 inches before lob, stairs require B UE and non-reciprocal gait pattern (pt reports no longer attempting in her 2 story house due to weakness), rhomberg see aboveL, SLS unable to perform without immediate lob. Discussed at length with pt how the above findings are impacting her safety and ability to [facility] independently. pain reduction will not be primary goal of therapy which pt agreed to Believe if pt does not receive skilled therapy services she will have increased risk for falls. Problem List: pain affecting function, decrease ROM, decrease strength, impaired gait/ balance, decrease ADL/ functional abilitiies, decrease activity tolerance, decrease flexibility/ joint mobility and decrease transfer abilities   Treatment Plan may include any combination of the following: Therapeutic exercise, Therapeutic activities, Neuromuscular re-education, Physical agent/modality, Gait/balance training, Manual therapy, Patient education, Self Care training, Functional mobility training, Home safety training and Stair training  Patient Goal(s) has been updated and includes:      Goals for this certification period include and are to be achieved in   4  weeks:  Continue as above  Frequency / Duration:   Patient to be seen   2   times per week for   4    weeks:  Assessments/Recommendations: continue therapy in clinical setting with emphasis on balance/strength to improve safety   If you have any questions/comments please contact us directly at [phone]. Thank you for allowing us to assist in the care of your patient.     Therapist Signature: Abhijit Li DPT, [facility]  Date: 4/6/06   Certification Period:  Reporting Period: 4/10/19-7/5/19 5/8/19-6/6/19 Time: 3:46 PM   NOTE TO PHYSICIAN:  PLEASE COMPLETE THE ORDERS BELOW AND FAX TO   [facility]: [phone]  If you are unable to process this request in 24 hours please contact our office: [phone]    ___ I have read the above report and request that my patient continue as recommended.   ___ I have read the above report and request that my patient continue therapy with the following changes/special instructions: ________________________________________________   ___ I have read the above report and request that my patient be discharged from therapy.      Physician Signature:        Date:       Time:

## 2019-06-11 ENCOUNTER — HOSPITAL ENCOUNTER (OUTPATIENT)
Dept: PHYSICAL THERAPY | Age: 79
Discharge: HOME | End: 2019-06-11
Payer: MEDICARE

## 2019-06-11 PROCEDURE — 97110 THERAPEUTIC EXERCISES: CPT | Performed by: PHYSICAL THERAPIST

## 2019-06-11 PROCEDURE — 97140 MANUAL THERAPY 1/> REGIONS: CPT | Performed by: PHYSICAL THERAPIST

## 2019-06-11 PROCEDURE — 97112 NEUROMUSCULAR REEDUCATION: CPT | Performed by: PHYSICAL THERAPIST

## 2019-06-11 NOTE — PROGRESS NOTES
[facility] PHYSICAL THERAPY - DAILY TREATMENT NOTE    Patient Name: Wing Peralta        Date: 2019  : 1940   yes Patient  Verified  Visit #:   15   of   16  Insurance: Payor: Areli Perish / Plan: [place] MEDICARE PART A & B / Product Type: Medicare /      In time: 230 Out time: 330   Total Treatment Time: 60     Medicare/BCBS Time Tracking (below)   Total Timed Codes (min):  50 1:1 Treatment Time:  50     TREATMENT AREA =  Low back pain [M54.5]    SUBJECTIVE  Pain Level (on 0 to 10 scale):  5 / 10   Medication Changes/New allergies or changes in medical history, any new surgeries or procedures?    no  If yes, update Summary List   Subjective Functional Status/Changes:  []  No changes reported     Today is a good day.  It makes me want to go and over due things but I know that is not smart        OBJECTIVE  Modalities Rationale:     decrease pain and increase tissue extensibility to improve patient's ability to ambulate prolonged distances    min [] Estim, type/location:                                      []  att     []  unatt     []  w/US     []  w/ice    []  w/heat    min []  Mechanical Traction: type/lbs                   []  pro   []  sup   []  int   []  cont    []  before manual    []  after manual    min []  Ultrasound, settings/location:      min []  Iontophoresis w/ dexamethasone, location:                                               []  take home patch       []  in clinic   10 min []  Ice     [x]  Heat    location/position: Sitting     min []  Vasopneumatic Device, press/temp:     min []  Other:    [x] Skin assessment post-treatment (if applicable):    [x]  intact    []  redness- no adverse reaction     []redness  adverse reaction:        25 min Therapeutic Exercise:  [x]  See flow sheet   Rationale:      increase ROM and increase strength to improve the patients ability to complete adls      10 min Manual Therapy: R hip prom all directions, R hs and piriformis stretch    Rationale:      decrease pain, increase ROM, increase tissue extensibility and decrease trigger points to improve patient's ability to complete adls       15 min Neuromuscular Re-ed: [x]  See flow sheet   Rationale:    improve coordination, improve balance and increase proprioception to improve the patients ability to ambulate safely       Billed With/As:   [] TE   [] TA   [] Neuro   [] Self Care Patient Education: [x] Review HEP    [] Progressed/Changed HEP based on:   [] positioning   [] body mechanics   [] transfers   [] heat/ice application    [] other:      Other Objective/Functional Measures:  Performed standing te first with reduced use of UE for balance control  Increased pres as per flow sheet with R hip flexion limited to 90 before er compensation      Post Treatment Pain Level (on 0 to 10) scale:   5  / 10     ASSESSMENT  Assessment/Changes in Function:   No increase in pain following exercise progression        []  See Progress Note/Recertification   Patient will continue to benefit from skilled PT services to modify and progress therapeutic interventions, address functional mobility deficits, address ROM deficits, address strength deficits, analyze and address soft tissue restrictions, analyze and cue movement patterns, analyze and modify body mechanics/ergonomics, assess and modify postural abnormalities and instruct in home and community integration to attain remaining goals. Progress toward goals / Updated goals:     Increasing balance and strength goals to progress functional ltg      PLAN  []  Upgrade activities as tolerated yes Continue plan of care   []  Discharge due to :    []  Other:      Therapist: Mary Adams PT    Date: 6/11/2019 Time: 2:36 PM     Future Appointments   Date Time Provider Leonel Salazar   6/11/2019  2:30 PM Maureen [facility]   6/13/2019  2:30 PM Maureen [facility]   6/18/2019  2:30 PM Judith Pizano PT [facility]   6/20/2019  2:30 PM Judith Pizano PT [facility]   6/24/2019  2:30 PM Antonia Chaves, PT [facility]   6/26/2019  2:30 PM Miguel Mireles, PT [facility]

## 2019-06-13 ENCOUNTER — HOSPITAL ENCOUNTER (OUTPATIENT)
Dept: PHYSICAL THERAPY | Age: 79
Discharge: HOME | End: 2019-06-13
Payer: MEDICARE

## 2019-06-13 PROCEDURE — 97140 MANUAL THERAPY 1/> REGIONS: CPT | Performed by: PHYSICAL THERAPIST

## 2019-06-13 NOTE — PROGRESS NOTES
[facility] PHYSICAL THERAPY - DAILY TREATMENT NOTE    Patient Name: Sb Pete        Date: 2019  : 1940   yes Patient  Verified  Visit #:   15   of   18  Insurance: Payor: Shalonda Sharma / Plan: [place] & [place] / Product Type: Medicare /      In time: 230 Out time: 318   Total Treatment Time: 48     Medicare/BCBS Time Tracking (below)   Total Timed Codes (min):  38 1:1 Treatment Time:  20     TREATMENT AREA =  Low back pain [M54.5]    SUBJECTIVE  Pain Level (on 0 to 10 scale):  4/ 10   Medication Changes/New allergies or changes in medical history, any new surgeries or procedures?    no  If yes, update Summary List   Subjective Functional Status/Changes:  []  No changes reported     My hip and back does not actually feel too bad.  I did something to my left ankle and it is really swollen        OBJECTIVE  Modalities Rationale:     decrease pain and increase tissue extensibility to improve patient's ability to ambulate prolonged distances    min [] Estim, type/location:                                      []  att     []  unatt     []  w/US     []  w/ice    []  w/heat    min []  Mechanical Traction: type/lbs                   []  pro   []  sup   []  int   []  cont    []  before manual    []  after manual    min []  Ultrasound, settings/location:      min []  Iontophoresis w/ dexamethasone, location:                                               []  take home patch       []  in clinic   10 min []  Ice     [x]  Heat    location/position: Sitting     min []  Vasopneumatic Device, press/temp:     min []  Other:    [x] Skin assessment post-treatment (if applicable):    [x]  intact    []  redness- no adverse reaction     []redness  adverse reaction:        18 min Therapeutic Exercise:  [x]  See flow sheet   Rationale:      increase ROM and increase strength to improve the patients ability to complete adls      20 min Manual Therapy: R hip prom all directions, R hs and piriformis stretch, adductor release Rationale:      decrease pain, increase ROM, increase tissue extensibility and decrease trigger points to improve patient's ability to complete adls       Billed With/As:   [] TE   [] TA   [] Neuro   [] Self Care Patient Education: [x] Review HEP    [] Progressed/Changed HEP based on:   [] positioning   [] body mechanics   [] transfers   [] heat/ice application    [] other:      Other Objective/Functional Measures:  Pt arrived to session with significant increase in L lateral ankle edema (unknown origin) and noted antalgic gait due to this vs lbp - in addition pt wore flip flops due to this so held on all standing  Pt able to maintain completely upright posture during seated LE exercises   1:1 mt only   Post Treatment Pain Level (on 0 to 10) scale:   3  / 10     ASSESSMENT  Assessment/Changes in Function:     No increase in pain following program   []  See Progress Note/Recertification   Patient will continue to benefit from skilled PT services to modify and progress therapeutic interventions, address functional mobility deficits, address ROM deficits, address strength deficits, analyze and address soft tissue restrictions, analyze and cue movement patterns, analyze and modify body mechanics/ergonomics, assess and modify postural abnormalities and instruct in home and community integration to attain remaining goals.    Progress toward goals / Updated goals:  Due to ankle swelling pt reports increased imbalance but denies falls which she states would have previously caused falls prior to surgery - subjective improvement      PLAN  []  Upgrade activities as tolerated yes Continue plan of care   []  Discharge due to :    []  Other:      Therapist: Malia Harper PT    Date: 6/13/2019 Time: 2:36 PM     Future Appointments   Date Time Provider Leonel Salazar   6/13/2019  2:30 PM Peter Crain [facility]   6/18/2019  2:30 PM Sol Valdes PT [facility]   6/20/2019  2:30 PM Sol Valdes PT [facility] 6/24/2019  2:30 PM Murray Nobles, PT [facility]   6/26/2019  2:30 PM Moise Mireles, PT [facility]

## 2019-06-18 ENCOUNTER — HOSPITAL ENCOUNTER (OUTPATIENT)
Dept: PHYSICAL THERAPY | Age: 79
Discharge: HOME | End: 2019-06-18
Payer: MEDICARE

## 2019-06-18 PROCEDURE — 97110 THERAPEUTIC EXERCISES: CPT | Performed by: PHYSICAL THERAPIST

## 2019-06-18 PROCEDURE — 97140 MANUAL THERAPY 1/> REGIONS: CPT | Performed by: PHYSICAL THERAPIST

## 2019-06-18 NOTE — PROGRESS NOTES
Guru Giang   PHYSICAL THERAPY - DAILY TREATMENT NOTE    Patient Name: Devang Phillips        Date: 2019  : 1940   yes Patient  Verified  Visit #:     Insurance: Payor: Marlena Breath / Plan: [place] MEDICARE PART A & B / Product Type: Medicare /      In time: 210 Out time: 325   Total Treatment Time: 70     Medicare/BCBS Time Tracking (below)   Total Timed Codes (min):  60 1:1 Treatment Time:  60     TREATMENT AREA =  Low back pain [M54.5]    SUBJECTIVE  Pain Level (on 0 to 10 scale):  4/ 10   Medication Changes/New allergies or changes in medical history, any new surgeries or procedures?    no  If yes, update Summary List   Subjective Functional Status/Changes:  []  No changes reported       Ankle pain is not as bad as it was before but now both feet are swelling I think that is related more to my high blood pressure than anything else      OBJECTIVE  Modalities Rationale:     decrease pain and increase tissue extensibility to improve patient's ability to ambulate prolonged distances    min [] Estim, type/location:                                      []  att     []  unatt     []  w/US     []  w/ice    []  w/heat    min []  Mechanical Traction: type/lbs                   []  pro   []  sup   []  int   []  cont    []  before manual    []  after manual    min []  Ultrasound, settings/location:      min []  Iontophoresis w/ dexamethasone, location:                                               []  take home patch       []  in clinic   10 min []  Ice     [x]  Heat    location/position: Sitting     min []  Vasopneumatic Device, press/temp:     min []  Other:    [x] Skin assessment post-treatment (if applicable):    [x]  intact    []  redness- no adverse reaction     []redness  adverse reaction:        40 min Therapeutic Exercise:  [x]  See flow sheet   Rationale:      increase ROM and increase strength to improve the patients ability to complete adls      20 min Manual Therapy: R hip prom all directions, R hs and piriformis stretch, adductor release   Rationale:      decrease pain, increase ROM, increase tissue extensibility and decrease trigger points to improve patient's ability to complete adls       Billed With/As:   [] TE   [] TA   [] Neuro   [] Self Care Patient Education: [x] Review HEP    [] Progressed/Changed HEP based on:   [] positioning   [] body mechanics   [] transfers   [] heat/ice application    [] other:      Other Objective/Functional Measures:  Denied pain with palpation to adductor region  Resumed all te as per flow sheet with reduced finger tip use for balance   Post Treatment Pain Level (on 0 to 10) scale:   3  / 10     ASSESSMENT  Assessment/Changes in Function:   Improved static balance with reduced use of ankle/hip strategy      []  See Progress Note/Recertification   Patient will continue to benefit from skilled PT services to modify and progress therapeutic interventions, address functional mobility deficits, address ROM deficits, address strength deficits, analyze and address soft tissue restrictions, analyze and cue movement patterns, analyze and modify body mechanics/ergonomics, assess and modify postural abnormalities and instruct in home and community integration to attain remaining goals.    Progress toward goals / Updated goals:  FOTO increased 5 points - progress towards goal      PLAN  []  Upgrade activities as tolerated yes Continue plan of care   []  Discharge due to :    []  Other:      Therapist: Kj Warren PT    Date: 6/18/2019 Time: 2:36 PM     Future Appointments   Date Time Provider Leonel Salazar   6/18/2019  2:30 PM Michelle Record [facility]   6/20/2019  2:30 PM Michelle Record [facility]   6/24/2019  2:30 PM Michelle Record [facility]   6/26/2019  2:30 PM Alex Ramos PT [facility]

## 2019-06-20 ENCOUNTER — HOSPITAL ENCOUNTER (OUTPATIENT)
Dept: PHYSICAL THERAPY | Age: 79
Discharge: HOME | End: 2019-06-20
Payer: MEDICARE

## 2019-06-20 ENCOUNTER — APPOINTMENT (OUTPATIENT)
Dept: PHYSICAL THERAPY | Age: 79
End: 2019-06-20
Payer: MEDICARE

## 2019-06-20 PROCEDURE — 97140 MANUAL THERAPY 1/> REGIONS: CPT | Performed by: PHYSICAL THERAPIST

## 2019-06-20 PROCEDURE — 97110 THERAPEUTIC EXERCISES: CPT | Performed by: PHYSICAL THERAPIST

## 2019-06-20 NOTE — PROGRESS NOTES
[facility] Garza PHYSICAL THERAPY - DAILY TREATMENT NOTE    Patient Name: Unique Xie        Date: 2019  : 1940   yes Patient  Verified  Visit #:     Insurance: Payor: Sha Many / Plan: [place] MEDICARE PART A & B / Product Type: Medicare /      In time: 230 Out time: 325   Total Treatment Time: 55     Medicare/BCBS Time Tracking (below)   Total Timed Codes (min):  45 1:1 Treatment Time:  30     TREATMENT AREA =  Low back pain [M54.5]    SUBJECTIVE  Pain Level (on 0 to 10 scale):  5-6/ 10   Medication Changes/New allergies or changes in medical history, any new surgeries or procedures?    no  If yes, update Summary List   Subjective Functional Status/Changes:  []  No changes reported     I just have pain in my legs because of the water build up.  I still have not taken the lasix or been good with drinking water       OBJECTIVE  Modalities Rationale:     decrease pain and increase tissue extensibility to improve patient's ability to ambulate prolonged distances    min [] Estim, type/location:                                      []  att     []  unatt     []  w/US     []  w/ice    []  w/heat    min []  Mechanical Traction: type/lbs                   []  pro   []  sup   []  int   []  cont    []  before manual    []  after manual    min []  Ultrasound, settings/location:      min []  Iontophoresis w/ dexamethasone, location:                                               []  take home patch       []  in clinic   10 min []  Ice     [x]  Heat    location/position: Sitting     min []  Vasopneumatic Device, press/temp:     min []  Other:    [x] Skin assessment post-treatment (if applicable):    [x]  intact    []  redness- no adverse reaction     []redness  adverse reaction:        40 min Therapeutic Exercise:  [x]  See flow sheet   Rationale:      increase ROM and increase strength to improve the patients ability to complete adls      15 min Manual Therapy: R hip prom all directions, R hs and piriformis stretch, adductor release   Rationale:      decrease pain, increase ROM, increase tissue extensibility and decrease trigger points to improve patient's ability to complete adls       Billed With/As:   [] TE   [] TA   [] Neuro   [] Self Care Patient Education: [x] Review HEP    [] Progressed/Changed HEP based on:   [] positioning   [] body mechanics   [] transfers   [] heat/ice application    [] other:      Other Objective/Functional Measures:  1:1 230-3  Visible swelling throughout LE distal to knee into ankle and foot  Denied pain with hip prom   Completed hurdles with 0 error    Post Treatment Pain Level (on 0 to 10) scale:   2  / 10     ASSESSMENT  Assessment/Changes in Function:   Improved static balance with reduced use of ankle/hip strategy      []  See Progress Note/Recertification   Patient will continue to benefit from skilled PT services to modify and progress therapeutic interventions, address functional mobility deficits, address ROM deficits, address strength deficits, analyze and address soft tissue restrictions, analyze and cue movement patterns, analyze and modify body mechanics/ergonomics, assess and modify postural abnormalities and instruct in home and community integration to attain remaining goals.    Progress toward goals / Updated goals:  ltg #3 achieved this session - will assess carry over into next session      PLAN  []  Upgrade activities as tolerated yes Continue plan of care   []  Discharge due to :    []  Other:      Therapist: Bill Blanchard PT    Date: 6/20/2019 Time: 2:36 PM     Future Appointments   Date Time Provider Leonel Salazar   7/2/2019  3:00 PM Husam Conrad, PT [facility]   7/9/2019  2:30 PM Husam Conrad, PT [facility]

## 2019-06-24 ENCOUNTER — APPOINTMENT (OUTPATIENT)
Dept: PHYSICAL THERAPY | Age: 79
End: 2019-06-24
Payer: MEDICARE

## 2019-06-26 ENCOUNTER — APPOINTMENT (OUTPATIENT)
Dept: PHYSICAL THERAPY | Age: 79
End: 2019-06-26
Payer: MEDICARE

## 2019-07-02 ENCOUNTER — HOSPITAL ENCOUNTER (OUTPATIENT)
Dept: PHYSICAL THERAPY | Age: 79
Discharge: HOME | End: 2019-07-02
Payer: MEDICARE

## 2019-07-02 PROCEDURE — 97110 THERAPEUTIC EXERCISES: CPT | Performed by: PHYSICAL THERAPIST

## 2019-07-02 NOTE — PROGRESS NOTES
Karen French PHYSICAL THERAPY - DAILY TREATMENT NOTE    Patient Name: Kathy Sullivan        Date: 2019  : 1940   yes Patient  Verified  Visit #:     Insurance: Payor: Sarah Ceron / Plan: [place] MEDICARE PART A & B / Product Type: Medicare /      In time: 302 Out time: 354   Total Treatment Time: 50     Medicare/BCBS Time Tracking (below)   Total Timed Codes (min):  40 1:1 Treatment Time:  40     TREATMENT AREA =  Low back pain [M54.5]    SUBJECTIVE  Pain Level (on 0 to 10 scale):  4/ 10   Medication Changes/New allergies or changes in medical history, any new surgeries or procedures?    no  If yes, update Summary List   Subjective Functional Status/Changes:  []  No changes reported     Today is a good day I have had very little discomfort in my low back. I have been doing all my balance stuff at home and it is really making a difference.  I am still having some issues with pain and that really limits the amount of activity I can do on those days      OBJECTIVE  Modalities Rationale:     decrease pain and increase tissue extensibility to improve patient's ability to ambulate prolonged distances    min [] Estim, type/location:                                      []  att     []  unatt     []  w/US     []  w/ice    []  w/heat    min []  Mechanical Traction: type/lbs                   []  pro   []  sup   []  int   []  cont    []  before manual    []  after manual    min []  Ultrasound, settings/location:      min []  Iontophoresis w/ dexamethasone, location:                                               []  take home patch       []  in clinic   10 min []  Ice     [x]  Heat    location/position: Sitting     min []  Vasopneumatic Device, press/temp:     min []  Other:    [x] Skin assessment post-treatment (if applicable):    [x]  intact    []  redness- no adverse reaction     []redness  adverse reaction:        40 min Therapeutic Exercise:  [x]  See flow sheet   Rationale:      increase ROM and increase strength to improve the patients ability to complete adls        Billed With/As:   [] TE   [] TA   [] Neuro   [] Self Care Patient Education: [x] Review HEP    [] Progressed/Changed HEP based on:   [] positioning   [] body mechanics   [] transfers   [] heat/ice application    [] other:      Other Objective/Functional Measures:   Required only one sitting break during standing break during te indicating improved tolerance to wb  One lob during tandem balance with fingertip use only    Post Treatment Pain Level (on 0 to 10) scale:   2  / 10     ASSESSMENT  Assessment/Changes in Function:   Improved static balance with reduced use of ankle/hip strategy      []  See Progress Note/Recertification   Patient will continue to benefit from skilled PT services to modify and progress therapeutic interventions, address functional mobility deficits, address ROM deficits, address strength deficits, analyze and address soft tissue restrictions, analyze and cue movement patterns, analyze and modify body mechanics/ergonomics, assess and modify postural abnormalities and instruct in home and community integration to attain remaining goals.    Progress toward goals / Updated goals:  ltg #3 achieved      PLAN  []  Upgrade activities as tolerated yes Continue plan of care   []  Discharge due to :    []  Other:      Therapist: Travis Osorio PT    Date: 7/2/2019 Time: 2:36 PM     Future Appointments   Date Time Provider Leonel Salazar   7/9/2019  2:30 PM Maryan Tanner PT [facility]   7/18/2019  2:00 PM [facility]   7/23/2019  2:30 PM [facility]   7/25/2019  2:30 PM Maryan Tanner PT [facility]

## 2019-07-09 ENCOUNTER — HOSPITAL ENCOUNTER (OUTPATIENT)
Dept: PHYSICAL THERAPY | Age: 79
Discharge: HOME | End: 2019-07-09
Payer: MEDICARE

## 2019-07-09 PROCEDURE — 97140 MANUAL THERAPY 1/> REGIONS: CPT | Performed by: PHYSICAL THERAPIST

## 2019-07-09 PROCEDURE — 97110 THERAPEUTIC EXERCISES: CPT | Performed by: PHYSICAL THERAPIST

## 2019-07-09 NOTE — PROGRESS NOTES
CLARISA Xie  PHYSICAL THERAPY   [facility] [address],[facility], [facility] - Phone: [phone]  Fax: [phone]  [address] [place] [address] PHYSICAL THERAPY          Patient Name: Kerri Mukherjee : 1940   Treatment/Medical Diagnosis: Low back pain [M54.5]   Onset Date: chronic    Referral Source: Kilo Cedeno DO Start of Care [facility]): 4/10/19   Prior Hospitalization: See Medical History Provider #: [id number]   Prior Level of Function: All adls limited by pain   Comorbidities: FMS, depression, arthritis, high blood pressure, CA    Medications: Verified on Patient Summary List   Visits from [facility]: 17 Missed Visits: 2     Goal/Measure of Progress Goal Met? 1. Pt will increase FOTO by 10 points in order to show functional improvement   Status at last Eval: 33/100 Current Status: 40/100 progressing   2. Pt will be able to sit<>stand from 48\" surface without UE use to improve transfers   Status at last Eval: 54\" surface  Current Status: 50\" surface  progressing   3. Pt will increase rhomberg balance >/= 30\" to improve static balance for adls such as dressing/self care   Status at last Eval: 20\" Current Status: 35\"  yes      Key Functional Changes/Progress: pt has made good progress with PT since transitioning to land based appointments. She still contineus to have varying pain levels but due to her condition discussed this was not the primary goal of therapy and improving balance/safety and functional mobility. Pt has reported improvement in both of these areas. . S Objective findings: LE MMT, hip flex 3+/5 B, hip abd 4-/5, R 3/5 L, ext 3/5 B, knee ext 4/5 R, 4-/5 L, DF/PF 4+/5, B, UE reduced shoulder flex 4/5 B, abd 3+/5 B, sit<>stand definitive use of hands, forward reach limited 24 inches stairs require  One UE and non-reciprocal gait pattern,  rhomberg see aboveL, SLS unable to perform without immediate lob.  Pt has 6 more visits and believe she would benefit form attendance due to significant progress made thus far. . Believe if pt does not receive skilled therapy services she will have increased risk for falls. Problem List: pain affecting function, decrease ROM, decrease strength, impaired gait/ balance, decrease ADL/ functional abilitiies, decrease activity tolerance, decrease flexibility/ joint mobility and decrease transfer abilities   Treatment Plan may include any combination of the following: Therapeutic exercise, Therapeutic activities, Neuromuscular re-education, Physical agent/modality, Gait/balance training, Manual therapy, Patient education, Self Care training, Functional mobility training, Home safety training and Stair training  Patient Goal(s) has been updated and includes:      Goals for this certification period include and are to be achieved in   3  weeks:  Continue as above  Pt will be I with advanced hep to prepare for d/c   Frequency / Duration:   Patient to be seen   2   times per week for   3    weeks:  Assessments/Recommendations: continue therapy in clinical setting with emphasis on balance/strength to improve safety   If you have any questions/comments please contact us directly at [phone]. Thank you for allowing us to assist in the care of your patient.     Therapist Signature: Grant Nuno DPT, [facility]  Date: [birth year]   Certification Period:  Reporting Period: 6/6/19-9/5/19 6/6/19-7/3/19 Time: 3:46 PM   NOTE TO PHYSICIAN:  PLEASE COMPLETE THE ORDERS BELOW AND FAX TO   [facility]: [phone]  If you are unable to process this request in 24 hours please contact our office: [phone]    ___ I have read the above report and request that my patient continue as recommended.   ___ I have read the above report and request that my patient continue therapy with the following changes/special instructions: ________________________________________________   ___ I have read the above report and request that my patient be discharged from therapy.      Physician Signature:        Date:       Time:

## 2019-07-09 NOTE — PROGRESS NOTES
Betsey January   PHYSICAL THERAPY - DAILY TREATMENT NOTE    Patient Name: Pam Codding        Date: 2019  : 1940   yes Patient  Verified  Visit #:     Insurance: Payor: Nathan Lund / Plan: [place] MEDICARE PART A & B / Product Type: Medicare /      In time: 236 Out time: 341   Total Treatment Time: 63     Medicare/BCBS Time Tracking (below)   Total Timed Codes (min):  53 1:1 Treatment Time:  27     TREATMENT AREA =  Low back pain [M54.5]    SUBJECTIVE  Pain Level (on 0 to 10 scale):  5-6/ 10   Medication Changes/New allergies or changes in medical history, any new surgeries or procedures?    no  If yes, update Summary List   Subjective Functional Status/Changes:  []  No changes reported     See pn        OBJECTIVE  Modalities Rationale:     decrease pain and increase tissue extensibility to improve patient's ability to ambulate prolonged distances    min [] Estim, type/location:                                      []  att     []  unatt     []  w/US     []  w/ice    []  w/heat    min []  Mechanical Traction: type/lbs                   []  pro   []  sup   []  int   []  cont    []  before manual    []  after manual    min []  Ultrasound, settings/location:      min []  Iontophoresis w/ dexamethasone, location:                                               []  take home patch       []  in clinic   10 min []  Ice     [x]  Heat    location/position: Sitting     min []  Vasopneumatic Device, press/temp:     min []  Other:    [x] Skin assessment post-treatment (if applicable):    [x]  intact    []  redness- no adverse reaction     []redness  adverse reaction:        40 min Therapeutic Exercise:  [x]  See flow sheet   Rationale:      increase ROM and increase strength to improve the patients ability to complete adls      13 min Manual Therapy: R hip prom all directions, R hs and piriformis stretch, adductor release   Rationale:      decrease pain, increase ROM, increase tissue extensibility and decrease trigger points to improve patient's ability to complete adls       Billed With/As:   [] TE   [] TA   [] Neuro   [] Self Care Patient Education: [x] Review HEP    [] Progressed/Changed HEP based on:   [] positioning   [] body mechanics   [] transfers   [] heat/ice application    [] other:      Other Objective/Functional Measures:  1:1 [id number]  See pn    Post Treatment Pain Level (on 0 to 10) scale:   3 / 10     ASSESSMENT  Assessment/Changes in Function:   See pn      []  See Progress Note/Recertification   Patient will continue to benefit from skilled PT services to modify and progress therapeutic interventions, address functional mobility deficits, address ROM deficits, address strength deficits, analyze and address soft tissue restrictions, analyze and cue movement patterns, analyze and modify body mechanics/ergonomics, assess and modify postural abnormalities and instruct in home and community integration to attain remaining goals.    Progress toward goals / Updated goals:  See pn     PLAN  []  Upgrade activities as tolerated yes Continue plan of care   []  Discharge due to :    []  Other:      Therapist: Adriana Mayes PT    Date: 7/9/2019 Time: 2:36 PM     Future Appointments   Date Time Provider Leonel Salazar   7/18/2019  2:00 PM [facility]   7/23/2019  2:30 PM [facility]ch[facility]   7/25/2019  2:30 PM [facility]in [facility]off, PT [facility]

## 2019-07-18 ENCOUNTER — APPOINTMENT (OUTPATIENT)
Dept: PHYSICAL THERAPY | Age: 79
End: 2019-07-18
Payer: MEDICARE

## 2019-07-23 ENCOUNTER — APPOINTMENT (OUTPATIENT)
Dept: PHYSICAL THERAPY | Age: 79
End: 2019-07-23
Payer: MEDICARE

## 2019-07-25 ENCOUNTER — APPOINTMENT (OUTPATIENT)
Dept: PHYSICAL THERAPY | Age: 79
End: 2019-07-25
Payer: MEDICARE

## 2020-03-18 NOTE — PROGRESS NOTES
CLARISA Xie [facility] PHYSICAL THERAPY   [address],[facility], [facility] - Phone: [phone]  Fax: [phone] FOR PHYSICAL THERAPY          Patient Name: Sreedhar Crowe : 1940   Treatment/Medical Diagnosis: Low back pain [M54.5]   Onset Date: chronic    Referral Source: Shanita Llamas,  Start of Care [facility]): 4/10/19   Prior Hospitalization: See Medical History Provider #: [id number]   Prior Level of Function: All adls limited by pain    Comorbidities: FMS, depression, arthritis, high blood pressure, CA   Medications: Verified on Patient Summary List   Visits from [facility]: 17 Missed Visits: 2     Goal/Measure of Progress Goal Met? 1. Pt will increase FOTO by 10 points in order to show functional improvement   Status at last Eval: 33/100 Current Status: 40/100 no   2. Pt will be able to sit<>stand from 48\" surface with IE use to improve transfers   Status at last Eval: 54\" Current Status: 50\" no   3. Pt will be I with advanced hep   Status at last Eval:  Current Status:  no     Key Functional Changes/Progress: pt did not return following the last PN and therefore a formal assessment could not be performed. The above findings were as of that time. Assessments/Recommendations: Other: self discharged    If you have any questions/comments please contact us directly at [phone]. Thank you for allowing us to assist in the care of your patient.     Therapist Signature: Estre Flaherty PT Date: 3/18/20   Reporting Period: 19-3/18/20 Time: 9:02 AM